# Patient Record
Sex: MALE | URBAN - METROPOLITAN AREA
[De-identification: names, ages, dates, MRNs, and addresses within clinical notes are randomized per-mention and may not be internally consistent; named-entity substitution may affect disease eponyms.]

---

## 2020-11-30 ENCOUNTER — APPOINTMENT (RX ONLY)
Dept: URBAN - METROPOLITAN AREA CLINIC 113 | Facility: CLINIC | Age: 30
Setting detail: DERMATOLOGY
End: 2020-11-30

## 2020-11-30 DIAGNOSIS — L81.4 OTHER MELANIN HYPERPIGMENTATION: ICD-10-CM

## 2020-11-30 DIAGNOSIS — I78.1 NEVUS, NON-NEOPLASTIC: ICD-10-CM

## 2020-11-30 DIAGNOSIS — L21.8 OTHER SEBORRHEIC DERMATITIS: ICD-10-CM

## 2020-11-30 DIAGNOSIS — Z71.89 OTHER SPECIFIED COUNSELING: ICD-10-CM

## 2020-11-30 PROCEDURE — ? RECOMMENDATIONS

## 2020-11-30 PROCEDURE — ? COUNSELING

## 2020-11-30 PROCEDURE — 99202 OFFICE O/P NEW SF 15 MIN: CPT

## 2020-11-30 PROCEDURE — ? SUNSCREEN RECOMMENDATIONS

## 2020-11-30 PROCEDURE — ? ADDITIONAL NOTES

## 2020-11-30 PROCEDURE — ? PRESCRIPTION

## 2020-11-30 PROCEDURE — ? TREATMENT REGIMEN

## 2020-11-30 RX ORDER — SODIUM SULFACETAMIDE, SULFUR 100; 50 MG/G; MG/G
SOLUTION TOPICAL
Qty: 1 | Refills: 2 | Status: ERX | COMMUNITY
Start: 2020-11-30

## 2020-11-30 RX ADMIN — SODIUM SULFACETAMIDE, SULFUR: 100; 50 SOLUTION TOPICAL at 00:00

## 2020-11-30 ASSESSMENT — LOCATION SIMPLE DESCRIPTION DERM
LOCATION SIMPLE: RIGHT NOSE
LOCATION SIMPLE: RIGHT ANTERIOR NECK
LOCATION SIMPLE: LEFT CHEEK
LOCATION SIMPLE: LEFT NOSE

## 2020-11-30 ASSESSMENT — LOCATION DETAILED DESCRIPTION DERM
LOCATION DETAILED: RIGHT INFERIOR ANTERIOR NECK
LOCATION DETAILED: LEFT INFERIOR CENTRAL MALAR CHEEK
LOCATION DETAILED: RIGHT NASAL ALAR GROOVE
LOCATION DETAILED: RIGHT SUPERIOR ANTERIOR NECK
LOCATION DETAILED: LEFT NASAL ALA

## 2020-11-30 ASSESSMENT — LOCATION ZONE DERM
LOCATION ZONE: NECK
LOCATION ZONE: NOSE
LOCATION ZONE: FACE

## 2020-11-30 NOTE — HPI: FACE (AGING FACE)
How Severe Is It?: moderate
Is This A New Presentation, Or A Follow-Up?: Aging Face
Additional History: Patient is interested in topical treatments to help aging face as he just received promotion at work and will be in front of clients daily.

## 2020-11-30 NOTE — PROCEDURE: RECOMMENDATIONS
Detail Level: Zone
Recommendation Preamble: The following recommendations were made during the visit:
Recommendations (Free Text): Vin Ley nightly

## 2020-11-30 NOTE — PROCEDURE: SUNSCREEN RECOMMENDATIONS

## 2020-11-30 NOTE — PROCEDURE: TREATMENT REGIMEN
Action 1: Continue
Start Regimen: - SulfaCleanse: wash affected areas twice daily
Detail Level: Zone

## 2020-11-30 NOTE — PROCEDURE: ADDITIONAL NOTES
Detail Level: Simple
Additional Notes: Patient given contact information for [facility] for DioLite Laser treatment

## 2020-11-30 NOTE — HPI: SKIN LESION
Is This A New Presentation, Or A Follow-Up?: Skin Lesions
What Type Of Note Output Would You Prefer (Optional)?: Bullet Format
How Severe Is Your Skin Lesion?: moderate
Has Your Skin Lesion Been Treated?: not been treated
Additional History: Not healing.

## 2020-12-03 ENCOUNTER — APPOINTMENT (RX ONLY)
Dept: URBAN - METROPOLITAN AREA CLINIC 111 | Facility: CLINIC | Age: 30
Setting detail: DERMATOLOGY
End: 2020-12-03

## 2020-12-03 DIAGNOSIS — Z41.9 ENCOUNTER FOR PROCEDURE FOR PURPOSES OTHER THAN REMEDYING HEALTH STATE, UNSPECIFIED: ICD-10-CM

## 2020-12-03 PROCEDURE — ? DIOLITE 532 LASER

## 2020-12-03 ASSESSMENT — LOCATION DETAILED DESCRIPTION DERM
LOCATION DETAILED: RIGHT INFERIOR ANTERIOR NECK
LOCATION DETAILED: LEFT INFERIOR ANTERIOR NECK

## 2020-12-03 ASSESSMENT — LOCATION SIMPLE DESCRIPTION DERM
LOCATION SIMPLE: LEFT ANTERIOR NECK
LOCATION SIMPLE: RIGHT ANTERIOR NECK

## 2020-12-03 ASSESSMENT — LOCATION ZONE DERM: LOCATION ZONE: NECK

## 2020-12-03 NOTE — PROCEDURE: DIOLITE 532 LASER
Post-Care Instructions: Diolite laser \\n What to Expect\\n    A. Redness or bruising in treated areas, with some vascular lesions still visible. \\n    B. Itching or mild discomfort, occasionally with slight blistering, followed by scabbing.\\n    C. Vessels or lesions may be discolored after treatment, and that it may last for several weeks during the healing process. \\n\\nCare of Treated Area(s)\\n    D. Wash as usual, with a gentle cleanser, but do not scrub. \\n    E. Apply CeraVe Healing Ointment or Vaseline to minimize crusting. \\n    F. If a blister opens or drains, you may apply Polysporin antibiotic ointment and a bandage, if needed. \\n    G. Use sunscreen consistently to minimize burning, irritation, and pigment changes in treated areas.\\nActivity to Avoid\\n    H. Do no pick/pull off scab. Allow to completely heal. \\n    I. Avoid excess sun exposure.\\nReturn to Clinic\\n    J. For any signs or symptoms of infection. \\n    K. If lesion is persistent 3 to 4 weeks after treatment, may return to clinic for additional treatment. Contact the office for a quote for the cost of further treatments.
Detail Level: Simple
Repetition Rate (Hz): 6
Indication: vascular lesion
Power: 3
Fluence In J/Cm2 (Optional): 19.5
Handpiece: 1000 microns
Price (Use Numbers Only, No Special Characters Or $): 90
Post-Procedure Care: advised to apply CeraVe Healing Ointment or Vaseline. Post care reviewed with patient.
Consent: Prior to the procedure consent was obtained, risks reviewed including but not limited to crusting, scabbing, blistering, scarring, darker or lighter pigmentary change, incidental hair removal, bruising, and/or incomplete removal.
Total Pulses (Optional): 189